# Patient Record
(demographics unavailable — no encounter records)

---

## 2025-01-09 NOTE — REVIEW OF SYSTEMS
[As Noted in HPI] : as noted in HPI [Arthralgias] : no arthralgias [Joint Pain] : no joint pain [Joint Swelling] : no joint swelling [Joint Stiffness] : no joint stiffness [Negative] : Musculoskeletal [de-identified] : Raynaud's

## 2025-01-09 NOTE — REVIEW OF SYSTEMS
[As Noted in HPI] : as noted in HPI [Arthralgias] : no arthralgias [Joint Pain] : no joint pain [Joint Swelling] : no joint swelling [Joint Stiffness] : no joint stiffness [Negative] : Musculoskeletal [de-identified] : Raynaud's

## 2025-01-09 NOTE — PHYSICAL EXAM
[General Appearance - Alert] : alert [General Appearance - In No Acute Distress] : in no acute distress [General Appearance - Well Nourished] : well nourished [Sclera] : the sclera and conjunctiva were normal [PERRL With Normal Accommodation] : pupils were equal in size, round, and reactive to light [Extraocular Movements] : extraocular movements were intact [Outer Ear] : the ears and nose were normal in appearance [Oropharynx] : the oropharynx was normal [Neck Appearance] : the appearance of the neck was normal [Respiration, Rhythm And Depth] : normal respiratory rhythm and effort [Edema] : there was no peripheral edema [No CVA Tenderness] : no ~M costovertebral angle tenderness [No Spinal Tenderness] : no spinal tenderness [Abnormal Walk] : normal gait [Nail Clubbing] : no clubbing  or cyanosis of the fingernails [Motor Tone] : muscle strength and tone were normal [] : no rash [No Focal Deficits] : no focal deficits [Oriented To Time, Place, And Person] : oriented to person, place, and time [Impaired Insight] : insight and judgment were intact [Affect] : the affect was normal [FreeTextEntry1] : No active Raynauds

## 2025-01-09 NOTE — HISTORY OF PRESENT ILLNESS
[FreeTextEntry1] : GERSON CAMPBELL is a 64 year old woman who presents with prior dx of sero+ (RF/CCP) RA in 2010 while getting Herceptin for breast ca, s/p AI which she completed in 2020. Reports high serologies at that time as well as diffuse, symmetrical arthritis, initially saw Dr Sheth but not able to start meds 2/2 breast ca treatment plan, managed with prednisone. When she came off cancer therapies, RA also subsided until recurrence in 2017. Started on PLQ in 2018 or 2019 and initially did see a partial improvement but has felt she has been flaring thru this since Dec -- worsening AM stiffness and pain in hands/wrists, knees, elbows, shoulders. Winter does tend to cause flares in the past as well. She usually manages flares with 1 week of quick steroid taper which she reports she was not using more than 2-3x/year prior. NSAIDs have not helped with prior flares. She has been offered biologics in the past but has declined 2/2 concerns for cancer risk, MTX was discussed but never started but she is amenable.   Inflammatory arthritis ROS negative for enthesitis, dactylitis, psoriasis/ rashes, eye inflammation, inflammatory low back pain, IBD.   + b/l knee OA -- R TKR + osteopenia with 1 reading of OP -- never treated, currently taking supplemental Ca/Vit D and gets weight bearing exercise. Not a fall risk, no prior fx or parental fx but + FH of OP. Former tobacco / social ETOH/ steroids as above.   XR R knee - mild OA  XR B/L hand 2015 - OA in DIPs, no inflammatory changes  CT chest - nonspecific GGO Current rheum - Dr Vogel  FH - likely mother with RA, cousin with AS   ----------- 5/20/22 -- Improvement in majority of joints, b/l knees and elbows are lagging. Tolerating MTX well, no SE. No extra-articular inflammatory sx. Mild hives with amoxicillin recently but resolved <24 hours. Feels well otherwise.   9/29/22 -- S/p covid infection, mild lingering dry cough, otherwise feels well, s/p monoclonals. Increased stress at home 2/2  with CHF. Joints stable, no extra-articular inflammatory sx, no infectious sx.   1/26/23 -- RA stable, no extra-articular inflammatory sx. Raynauds mild since Oct, only with some color changes, responsive to conservative measures, no threatened digits or ulcerations. Recently her partner passed away, still grieving, has resumed some tobacco use and increased wine consumption in this setting but is mindful and has a plan to slow down/stop.   4/18/23 -- Traumatic R hip fx, s/p R THR, has healed up well, using cane only prn, no falls, will be going on cruise. Raynaud's in R index finger only, no ulcerations or threatened digits, never started CCB. RA stable, no extra-articular inflammatory sx. + Sisters with fractures but mother without, prior steroid use, occasional EOTH.   8/17/23 -- No active RA sx, no extra-articular inflammatory sx, no infectious sx, forgetting evening PLQ dose but no worsening, optho exam UTD. Some diarrhea with Fosamax/MTX combo. Has been able to cut down on tobacco use.   12/14/23 -- No active RA, no extra-articular inflammatory sx. Still getting over a URI but very mild sx, no fevers. No falls, no hip pain. Raynauds stable. Didn't tolerate Fosamax 2/2 diarrhea when with spacing from MTX, this has resolved since stopping it.    4/18/24 -- Tolerated Reclast well. Approx 1 month later began to have migratory synovitis, significant ADL impact on some days then resolves. Currently with activity in L wrist, R elbow, L knee - latter needing brace. No extra-articular inflammatory sx, no infectious sx. Trialled short prednisone taper with only partial relief. No falls. New anemia - no GIB or other bleeding, is taking 3-4 full dose ASA daily for pain.    5/31/24 -- Use related knee and shoulder arthralgias since off steroids x 1 week, but no synovitis or AM stiffness, no extra-articular inflammatory sx. Mild URI sx x 2 days, no F/C. Raynaud's stable.   9/12/24 -- 2 episodes of b/l eye infection, sinus congestion - improving with topicals Abx and steroid gtt. Arthralgias/ AM stiffness more progressive in upper back, shoulders, b/l knees (including replaced one, index finger b/l. No overt synovitis, effusions. ROM diffusely intact, no extra-articular inflammatory sx.   1/2/25 -- Overall doing better now in terms of joint pain, finds she is well controlled with HCQ and MTX, no extra-articular inflammatory sx. Had tooth extraction x 3 in Nov 2024 with graft, is healing now. Plan is in March 2025 to evaluate for tooth implant, thus prefers to hold on Reclast infusion. Also endorsed another episode of bilateral eye infection- improving with steroid drops. Raynaud's stable.

## 2025-01-09 NOTE — ASSESSMENT
[FreeTextEntry1] : GERSON CAMPBELL is a 66 year old woman with below --   # sero+ RA, improved with addition of steroids and MTX, has been resistant to starting biologics in the past 2/2 breast ca. Joint symptoms currently well controlled, no longer on steroids  - c/w PLQ 200mg daily, optho eval UTD - repeat annually  - c/w MTX10 tabs qweekly + FA 1mg daily. Patient is aware to avoid daily alcohol consumption - recent labs reviewed in detail with improvement in inflammatory markers - repeat prior to next visit  - C/w PT - reports some improvement in shoulder pain  - Will hold off on escalating treatment as pt is doing well at present. Hepatitis panel, Quantiferon negative in Dec 2024 in case they are needed   # Bilateral Viral Conjunctivitis - Recent notes from ophtho sent over. Recent eye exam consistent with viral conjunctivitis and not inflammatory ocular involvement of RA. - No change in regimen needed   # immunosuppressed status - pt counseled to call if febrile or unwell, counseled to hold medication while on Abx  # Raynaud's - c/w non-pharmacological measures for Raynaud's - gloves, pocket warmers, limiting cold exposure - low dose CCB only if worsening   # OP on recent DEXA, + chronic intermittent steroid use, + hip fracture  # S/p tooth extraction, requiring implants  - s/p Reclast Jan 2024 - is due for next dose this month, however will hold off pending completion of dental work - Will be re-evaluated by oral surgeon in March 2025 for potential implants. Will readdress treatment at that time.  - repeat DEXA 9/2025  - c/w Ca 600mg BID with food, Vit D 1000 IU daily to optimize Ca/Vit D - c/w weight bearing exercise for 30min 3-4x/week to maintain BMD  RTC in 4-5 months (after she sees oral surgeon), sooner if flaring   Seen with PGY-5 Rheumatology Fellow, Alessia Montiel MD

## 2025-04-09 NOTE — HISTORY OF PRESENT ILLNESS
[FreeTextEntry1] : GERSON CAMPBELL is a 64 year old woman who presents with prior dx of sero+ (RF/CCP) RA in 2010 while getting Herceptin for breast ca, s/p AI which she completed in 2020. Reports high serologies at that time as well as diffuse, symmetrical arthritis, initially saw Dr Sheth but not able to start meds 2/2 breast ca treatment plan, managed with prednisone. When she came off cancer therapies, RA also subsided until recurrence in 2017. Started on PLQ in 2018 or 2019 and initially did see a partial improvement but has felt she has been flaring thru this since Dec -- worsening AM stiffness and pain in hands/wrists, knees, elbows, shoulders. Winter does tend to cause flares in the past as well. She usually manages flares with 1 week of quick steroid taper which she reports she was not using more than 2-3x/year prior. NSAIDs have not helped with prior flares. She has been offered biologics in the past but has declined 2/2 concerns for cancer risk, MTX was discussed but never started but she is amenable.   Inflammatory arthritis ROS negative for enthesitis, dactylitis, psoriasis/ rashes, eye inflammation, inflammatory low back pain, IBD.   + b/l knee OA -- R TKR + osteopenia with 1 reading of OP -- never treated, currently taking supplemental Ca/Vit D and gets weight bearing exercise. Not a fall risk, no prior fx or parental fx but + FH of OP. Former tobacco / social ETOH/ steroids as above.   XR R knee - mild OA  XR B/L hand 2015 - OA in DIPs, no inflammatory changes  CT chest - nonspecific GGO Current rheum - Dr Vogel  FH - likely mother with RA, cousin with AS   ----------- 5/20/22 -- Improvement in majority of joints, b/l knees and elbows are lagging. Tolerating MTX well, no SE. No extra-articular inflammatory sx. Mild hives with amoxicillin recently but resolved <24 hours. Feels well otherwise.   9/29/22 -- S/p covid infection, mild lingering dry cough, otherwise feels well, s/p monoclonals. Increased stress at home 2/2  with CHF. Joints stable, no extra-articular inflammatory sx, no infectious sx.   1/26/23 -- RA stable, no extra-articular inflammatory sx. Raynauds mild since Oct, only with some color changes, responsive to conservative measures, no threatened digits or ulcerations. Recently her partner passed away, still grieving, has resumed some tobacco use and increased wine consumption in this setting but is mindful and has a plan to slow down/stop.   4/18/23 -- Traumatic R hip fx, s/p R THR, has healed up well, using cane only prn, no falls, will be going on cruise. Raynaud's in R index finger only, no ulcerations or threatened digits, never started CCB. RA stable, no extra-articular inflammatory sx. + Sisters with fractures but mother without, prior steroid use, occasional EOTH.   8/17/23 -- No active RA sx, no extra-articular inflammatory sx, no infectious sx, forgetting evening PLQ dose but no worsening, optho exam UTD. Some diarrhea with Fosamax/MTX combo. Has been able to cut down on tobacco use.   12/14/23 -- No active RA, no extra-articular inflammatory sx. Still getting over a URI but very mild sx, no fevers. No falls, no hip pain. Raynauds stable. Didn't tolerate Fosamax 2/2 diarrhea when with spacing from MTX, this has resolved since stopping it.    4/18/24 -- Tolerated Reclast well. Approx 1 month later began to have migratory synovitis, significant ADL impact on some days then resolves. Currently with activity in L wrist, R elbow, L knee - latter needing brace. No extra-articular inflammatory sx, no infectious sx. Trialled short prednisone taper with only partial relief. No falls. New anemia - no GIB or other bleeding, is taking 3-4 full dose ASA daily for pain.    5/31/24 -- Use related knee and shoulder arthralgias since off steroids x 1 week, but no synovitis or AM stiffness, no extra-articular inflammatory sx. Mild URI sx x 2 days, no F/C. Raynaud's stable.   9/12/24 -- 2 episodes of b/l eye infection, sinus congestion - improving with topicals Abx and steroid gtt. Arthralgias/ AM stiffness more progressive in upper back, shoulders, b/l knees (including replaced one, index finger b/l. No overt synovitis, effusions. ROM diffusely intact, no extra-articular inflammatory sx.   1/2/25 -- Overall doing better now in terms of joint pain, finds she is well controlled with HCQ and MTX, no extra-articular inflammatory sx. Had tooth extraction x 3 in Nov 2024 with graft, is healing now. Plan is in March 2025 to evaluate for tooth implant, thus prefers to hold on Reclast infusion. Also endorsed another episode of bilateral eye infection- improving with steroid drops. Raynaud's stable.   4/8/25 -- No joint activity, no extra-articular inflammatory sx, no infectious sx. Raynaud's stable, occasional R index finger occasionally cramping. Still smoking, 10 cigs/day, still motivated to quit. 2 recent dental implants, would be ready for Reclast in Oct, doing a lot of weight bearing exericse, + Vit D.

## 2025-04-09 NOTE — ASSESSMENT
[FreeTextEntry1] : GERSON CAMPBELL is a 67 year old woman with below --   # sero+ RA, improved with addition of steroids and MTX, has been resistant to starting biologics in the past 2/2 breast ca. Joint symptoms currently well controlled, no longer on steroids  - c/w PLQ 200mg daily, optho eval UTD - repeat annually  - c/w MTX10 tabs qweekly + FA 1mg daily. Patient is aware to avoid daily alcohol consumption - recent labs reviewed and stable - repeat prior to next visit  - Will hold off on escalating treatment as pt is doing well at present. Hepatitis panel, Quantiferon negative in Dec 2024 in case they are needed   # B/l Viral Conjunctivitis per optho notes, improved, pt managing with conservative measures  - Pt to update me if ever dx with inflammatory eye dz   # immunosuppressed status - pt counseled to call if febrile or unwell, counseled to hold medication while on Abx  # Raynaud's - c/w non-pharmacological measures for Raynaud's - gloves, pocket warmers, limiting cold exposure - low dose CCB only if worsening   # OP on recent DEXA, + chronic intermittent steroid use, + hip fracture  # S/p tooth extraction, requiring implants  - s/p Reclast Jan 2024 - is due for next dose once s/p completion of dental work possible late oct-Nov - repeat DEXA 9/2025  - c/w Ca 600mg BID with food, Vit D 1000 IU daily to optimize Ca/Vit D - c/w weight bearing exercise for 30min 3-4x/week to maintain BMD  RTC in 6 months, sooner if flaring

## 2025-04-09 NOTE — REVIEW OF SYSTEMS
[Joint Swelling] : no joint swelling [Joint Stiffness] : no joint stiffness [Negative] : ENT [FreeTextEntry3] : stable with conservative measures  [de-identified] : Raynaud's

## 2025-04-09 NOTE — REVIEW OF SYSTEMS
[Joint Swelling] : no joint swelling [Joint Stiffness] : no joint stiffness [Negative] : ENT [FreeTextEntry3] : stable with conservative measures  [de-identified] : Raynaud's

## 2025-05-23 NOTE — PHYSICAL EXAM
[Normocephalic] : normocephalic [Sclera nonicteric] : sclera nonicteric [Supple] : supple [No Supraclavicular Adenopathy] : no supraclavicular adenopathy [No Cervical Adenopathy] : no cervical adenopathy [No Thyromegaly] : no thyromegaly [Clear to Auscultation Bilat] : clear to auscultation bilaterally [Examined in the supine and seated position] : examined in the supine and seated position [No dominant masses] : no dominant masses in right breast  [No dominant masses] : no dominant masses left breast [No Nipple Retraction] : no left nipple retraction [No Nipple Discharge] : no left nipple discharge [No Axillary Lymphadenopathy] : no left axillary lymphadenopathy [Soft] : abdomen soft [Not Tender] : non-tender [No Palpable Masses] : no abdominal mass palpated [de-identified] : Left cervical rib. [de-identified] : Scar UIQ and circumareolar. Fine blanching telangiectasias centrally.

## 2025-05-23 NOTE — HISTORY OF PRESENT ILLNESS
[FreeTextEntry1] : This is a 67 year old  female who was diagnosed with left breast cancer in 3/2010.  She had a lumpectomy with SLN biopsy for a 1.7 cm poorly diff ductal cancer, SLN negx2, ER 90%WV 5%HER2+. She was on the [place] trail and received THC with Tykerb.  She then received radiation and took anastrozole for 10 years.  A skin and underlying breast tissue biopsy was performed in 2016 for redness and thickening of her left breast with benign results.    She has no current breast complaints.

## 2025-05-23 NOTE — PHYSICAL EXAM
[Normocephalic] : normocephalic [Sclera nonicteric] : sclera nonicteric [Supple] : supple [No Supraclavicular Adenopathy] : no supraclavicular adenopathy [No Cervical Adenopathy] : no cervical adenopathy [No Thyromegaly] : no thyromegaly [Clear to Auscultation Bilat] : clear to auscultation bilaterally [Examined in the supine and seated position] : examined in the supine and seated position [No dominant masses] : no dominant masses in right breast  [No dominant masses] : no dominant masses left breast [No Nipple Retraction] : no left nipple retraction [No Nipple Discharge] : no left nipple discharge [No Axillary Lymphadenopathy] : no left axillary lymphadenopathy [Soft] : abdomen soft [Not Tender] : non-tender [No Palpable Masses] : no abdominal mass palpated [de-identified] : Left cervical rib. [de-identified] : Scar UIQ and circumareolar. Fine blanching telangiectasias centrally.

## 2025-05-23 NOTE — PAST MEDICAL HISTORY
[Menarche Age ____] : age at menarche was [unfilled] [Menopause Age____] : age at menopause was [unfilled] [Total Preg ___] : G[unfilled] [Live Births ___] : P[unfilled]  [Age At Live Birth ___] : Age at live birth: [unfilled] [FreeTextEntry7] : ages 17-30

## 2025-05-23 NOTE — CONSULT LETTER
[Courtesy Letter:] : I had the pleasure of seeing your patient, [unfilled], in my office today. [Referral Closing:] : Thank you very much for seeing this patient.  If you have any questions, please do not hesitate to contact me. [Dear  ___] : Dear  [unfilled],

## 2025-05-23 NOTE — HISTORY OF PRESENT ILLNESS
[FreeTextEntry1] : This is a 67 year old  female who was diagnosed with left breast cancer in 3/2010.  She had a lumpectomy with SLN biopsy for a 1.7 cm poorly diff ductal cancer, SLN negx2, ER 90%DE 5%HER2+. She was on the [place] trail and received THC with Tykerb.  She then received radiation and took anastrozole for 10 years.  A skin and underlying breast tissue biopsy was performed in 2016 for redness and thickening of her left breast with benign results.    She has no current breast complaints.

## 2025-05-23 NOTE — DATA REVIEWED
[FreeTextEntry1] : Bilateral mammogram 2/27/2025:  No mammographic evidence of malignancy. Extremely dense.  Bilateral breast ultrasound  2/27/2025:  Right 8 N3 4 mm cyst cluster stable.  (Images reviewed on PACS.)   Bilateral breast MRI  6/13/2023:  No MRI evidence of malignancy.

## 2025-06-11 NOTE — HISTORY OF PRESENT ILLNESS
[FreeTextEntry8] : cc: new pt, anxiety , smoking  Patient presented  to establish care, Pt denies CP, SOB ,abd pain, no N,no C.Pt  f/u with Rheumatol RA Anxiety stable,

## 2025-06-11 NOTE — COUNSELING
[Yes] : Risk of tobacco use and health benefits of smoking cessation discussed: Yes [Cessation strategies including cessation program discussed] : Cessation strategies including cessation program discussed [No] : Not willing to quit smoking [FreeTextEntry1] : 3 [Good understanding] : Patient has a good understanding of lifestyle changes and steps needed to achieve self management goal

## 2025-06-11 NOTE — HEALTH RISK ASSESSMENT
[No falls in past year] : Patient reported no falls in the past year [2] : 2) Feeling down, depressed, or hopeless for more than half of the days (2) [0] : 1) Little interest or pleasure doing things: Not at all (0) [PHQ-2 Negative - No further assessment needed] : PHQ-2 Negative - No further assessment needed [1/2 of Days or More (2)] : 2.) Feeling down, depressed or hopeless? Half the days or more [Several Days (1)] : 3.) Trouble falling asleep, or sleeping too much? Several days [Not at All (0)] : 8.) Moving or speaking so slowly that other people could have noticed, or the opposite, moving or speaking faster than usual? Not at all [Not at all] : How difficult have these problems made it for you to do your work, take care of things at home, or get along with people? Not at all [Mild] : Severity of Depression is Mild [Current] : Current [de-identified] : Rheumatol  [de-identified] : walking  [de-identified] : regural  [[id number]] : 2 [[id number]] : 3 [de-identified] : 10 cig a  week

## 2025-06-11 NOTE — PHYSICAL EXAM
[No Acute Distress] : no acute distress [No Edema] : there was no peripheral edema [No Joint Swelling] : no joint swelling [Grossly Normal Strength/Tone] : grossly normal strength/tone [Deep Tendon Reflexes (DTR)] : deep tendon reflexes were 2+ and symmetric [Alert and Oriented x3] : oriented to person, place, and time [Normal] : affect was normal and insight and judgment were intact

## 2025-06-11 NOTE — REVIEW OF SYSTEMS
[Suicidal] : not suicidal [Insomnia] : insomnia [Anxiety] : no anxiety [Depression] : no depression [Negative] : Psychiatric